# Patient Record
Sex: FEMALE | Race: WHITE | NOT HISPANIC OR LATINO | Employment: PART TIME | URBAN - METROPOLITAN AREA
[De-identification: names, ages, dates, MRNs, and addresses within clinical notes are randomized per-mention and may not be internally consistent; named-entity substitution may affect disease eponyms.]

---

## 2020-01-27 PROBLEM — G40.209 COMPLEX PARTIAL EPILEPSY WITH GENERALIZATION: Status: ACTIVE | Noted: 2020-01-27

## 2020-06-29 PROBLEM — Z12.11 SCREENING FOR COLORECTAL CANCER: Status: ACTIVE | Noted: 2020-06-29

## 2020-06-29 PROBLEM — Z12.12 SCREENING FOR COLORECTAL CANCER: Status: ACTIVE | Noted: 2020-06-29

## 2020-07-12 ENCOUNTER — NURSE TRIAGE (OUTPATIENT)
Dept: ADMINISTRATIVE | Facility: CLINIC | Age: 50
End: 2020-07-12

## 2020-07-12 NOTE — TELEPHONE ENCOUNTER
Attempted to call patient on behalf of [facility]. Pt did not answer on day 13. Unable to leave message. No contact protocol in place.

## 2020-07-12 NOTE — TELEPHONE ENCOUNTER
Reason for Disposition   Second attempt to contact family AND no contact made.  Phone number verified.    Additional Information   Negative: Caller is angry or rude (e.g., hangs up, verbally abusive, yelling)   Negative: Caller hangs up   Negative: Caller has already spoken with the PCP and has no further questions.   Negative: Caller has already spoken with another triager and has no further questions.   Negative: Caller has already spoken with another triager or PCP AND has further questions AND triager able to answer questions.   Negative: No answer.  First attempt to contact caller.  Follow-up call scheduled within 15 minutes.    Protocols used: NO CONTACT OR DUPLICATE CONTACT CALL-A-

## 2021-05-04 ENCOUNTER — PATIENT MESSAGE (OUTPATIENT)
Dept: RESEARCH | Facility: HOSPITAL | Age: 51
End: 2021-05-04

## 2021-08-14 ENCOUNTER — NURSE TRIAGE (OUTPATIENT)
Dept: ADMINISTRATIVE | Facility: CLINIC | Age: 51
End: 2021-08-14

## 2021-09-07 ENCOUNTER — NURSE TRIAGE (OUTPATIENT)
Dept: ADMINISTRATIVE | Facility: CLINIC | Age: 51
End: 2021-09-07

## 2021-11-10 ENCOUNTER — HOSPITAL ENCOUNTER (OUTPATIENT)
Dept: RADIOLOGY | Facility: HOSPITAL | Age: 51
Discharge: HOME | End: 2021-11-10
Attending: PHYSICIAN ASSISTANT
Payer: MEDICAID

## 2021-11-10 ENCOUNTER — OFFICE VISIT (OUTPATIENT)
Dept: NEUROLOGY | Facility: CLINIC | Age: 51
End: 2021-11-10
Payer: MEDICAID

## 2021-11-10 VITALS
WEIGHT: 122.38 LBS | SYSTOLIC BLOOD PRESSURE: 142 MMHG | BODY MASS INDEX: 20.89 KG/M2 | HEART RATE: 67 BPM | DIASTOLIC BLOOD PRESSURE: 86 MMHG | OXYGEN SATURATION: 98 % | HEIGHT: 64 IN

## 2021-11-10 DIAGNOSIS — M79.2 RADICULAR PAIN IN LEFT ARM: ICD-10-CM

## 2021-11-10 DIAGNOSIS — G40.209 PARTIAL SYMPTOMATIC EPILEPSY WITH COMPLEX PARTIAL SEIZURES, NOT INTRACTABLE, WITHOUT STATUS EPILEPTICUS: Primary | Chronic | ICD-10-CM

## 2021-11-10 DIAGNOSIS — M85.80 OSTEOPENIA, UNSPECIFIED LOCATION: ICD-10-CM

## 2021-11-10 DIAGNOSIS — F41.9 ANXIETY: ICD-10-CM

## 2021-11-10 DIAGNOSIS — M54.2 NECK PAIN OF OVER 3 MONTHS DURATION: ICD-10-CM

## 2021-11-10 DIAGNOSIS — R20.0 BILATERAL HAND NUMBNESS: ICD-10-CM

## 2021-11-10 DIAGNOSIS — F41.1 GAD (GENERALIZED ANXIETY DISORDER): ICD-10-CM

## 2021-11-10 DIAGNOSIS — E53.8 VITAMIN B 12 DEFICIENCY: ICD-10-CM

## 2021-11-10 PROCEDURE — 99204 OFFICE O/P NEW MOD 45 MIN: CPT | Mod: S$PBB,,, | Performed by: PHYSICIAN ASSISTANT

## 2021-11-10 PROCEDURE — 99999 PR PBB SHADOW E&M-EST. PATIENT-LVL III: CPT | Mod: PBBFAC,,, | Performed by: PHYSICIAN ASSISTANT

## 2021-11-10 PROCEDURE — 99999 PR PBB SHADOW E&M-EST. PATIENT-LVL III: ICD-10-PCS | Mod: PBBFAC,,, | Performed by: PHYSICIAN ASSISTANT

## 2021-11-10 PROCEDURE — 96372 THER/PROPH/DIAG INJ SC/IM: CPT | Mod: PBBFAC

## 2021-11-10 PROCEDURE — 72040 X-RAY EXAM NECK SPINE 2-3 VW: CPT | Mod: 26,,, | Performed by: RADIOLOGY

## 2021-11-10 PROCEDURE — 99204 PR OFFICE/OUTPT VISIT, NEW, LEVL IV, 45-59 MIN: ICD-10-PCS | Mod: S$PBB,,, | Performed by: PHYSICIAN ASSISTANT

## 2021-11-10 PROCEDURE — 72040 X-RAY EXAM NECK SPINE 2-3 VW: CPT | Mod: TC

## 2021-11-10 PROCEDURE — 72040 XR CERVICAL SPINE 2 OR 3 VIEWS: ICD-10-PCS | Mod: 26,,, | Performed by: RADIOLOGY

## 2021-11-10 PROCEDURE — 99213 OFFICE O/P EST LOW 20 MIN: CPT | Mod: PBBFAC | Performed by: PHYSICIAN ASSISTANT

## 2021-11-10 RX ORDER — CYANOCOBALAMIN 1000 UG/ML
1000 INJECTION, SOLUTION INTRAMUSCULAR; SUBCUTANEOUS
Status: COMPLETED | OUTPATIENT
Start: 2021-11-10 | End: 2021-11-10

## 2021-11-10 RX ORDER — LACOSAMIDE 100 MG/1
TABLET ORAL
Qty: 60 TABLET | Refills: 5 | Status: SHIPPED | OUTPATIENT
Start: 2021-11-10 | End: 2022-07-06

## 2021-11-10 RX ORDER — SERTRALINE HYDROCHLORIDE 100 MG/1
100 TABLET, FILM COATED ORAL DAILY
Qty: 30 TABLET | Refills: 5 | Status: SHIPPED | OUTPATIENT
Start: 2021-11-10 | End: 2022-07-06 | Stop reason: SDUPTHER

## 2021-11-10 RX ORDER — CYANOCOBALAMIN 1000 UG/ML
1000 INJECTION, SOLUTION INTRAMUSCULAR; SUBCUTANEOUS
Qty: 3 ML | Refills: 3 | Status: SHIPPED | OUTPATIENT
Start: 2021-11-10 | End: 2023-01-24

## 2021-11-10 RX ORDER — CLONAZEPAM 1 MG/1
TABLET ORAL
Qty: 60 TABLET | Refills: 5 | Status: SHIPPED | OUTPATIENT
Start: 2021-11-10 | End: 2022-05-24 | Stop reason: SDUPTHER

## 2021-11-10 RX ORDER — LAMOTRIGINE 200 MG/1
200 TABLET ORAL 2 TIMES DAILY
Qty: 60 TABLET | Refills: 5 | Status: SHIPPED | OUTPATIENT
Start: 2021-11-10 | End: 2022-07-06 | Stop reason: SDUPTHER

## 2021-11-10 RX ORDER — GABAPENTIN 100 MG/1
CAPSULE ORAL
Qty: 120 CAPSULE | Refills: 5 | Status: SHIPPED | OUTPATIENT
Start: 2021-11-10 | End: 2022-07-06

## 2021-11-10 RX ADMIN — CYANOCOBALAMIN 1000 MCG: 1000 INJECTION, SOLUTION INTRAMUSCULAR; SUBCUTANEOUS at 09:11

## 2022-01-01 ENCOUNTER — NURSE TRIAGE (OUTPATIENT)
Dept: ADMINISTRATIVE | Facility: CLINIC | Age: 52
End: 2022-01-01
Payer: MEDICAID

## 2022-01-01 NOTE — TELEPHONE ENCOUNTER
[facility] pt states lower back feels like she's been beaten up x3 days..     Pt was advised to see PCP within 3 days per triage protocol. Pt verbalized understanding.  Pt was advised if no appt to see HCP through [facility] pt verbalized understanding.    Reason for Disposition   [1] MODERATE back pain (e.g., interferes with normal activities) AND [2] present > 3 days    Additional Information   Negative: Passed out (i.e., lost consciousness, collapsed and was not responding)   Negative: Shock suspected (e.g., cold/pale/clammy skin, too weak to stand, low BP, rapid pulse)   Negative: Sounds like a life-threatening emergency to the triager   Negative: [1] SEVERE back pain (e.g., excruciating) AND [2] sudden onset AND [3] age > 60 years   Negative: [1] Unable to urinate (or only a few drops) > 4 hours AND [2] bladder feels very full (e.g., palpable bladder or strong urge to urinate)   Negative: [1] Loss of bladder or bowel control (urine or bowel incontinence; wetting self, leaking stool) AND [2] new-onset   Negative: Numbness in groin or rectal area (i.e., loss of sensation)   Negative: [1] SEVERE abdominal pain AND [2] present > 1 hour   Negative: [1] Abdominal pain AND [2] age > 60 years   Negative: Weakness of a leg or foot (e.g., unable to bear weight, dragging foot)   Negative: Unable to walk   Negative: Patient sounds very sick or weak to the triager   Negative: [1] SEVERE back pain (e.g., excruciating, unable to do any normal activities) AND [2] not improved 2 hours after pain medicine   Negative: [1] Pain radiates into the thigh or further down the leg AND [2] both legs   Negative: [1] Fever > 100.0 F (37.8 C) AND [2] flank pain (i.e., in side, below ribs and above hip)   Negative: [1] Pain or burning with passing urine (urination) AND [2] flank pain (i.e., in side, below ribs and above hip)   Negative: Numbness in a leg or foot (i.e., loss of sensation)   Negative: [1] Numbness in an arm or hand (i.e.,  "loss of sensation) AND [2] upper back pain   Negative: High-risk adult (e.g., history of cancer, HIV, or IV drug use)   Negative: Soft tissue infection (e.g., abscess, cellulitis) or other serious infection (e.g., bacteremia) in last 2 weeks   Negative: [1] Fever AND [2] no symptoms of UTI  (Exception: has generalized muscle pains, not localized back pain)   Negative: Rash in same area as pain (may be described as "small blisters")   Negative: Blood in urine (red, pink, or tea-colored)    Protocols used: BACK PAIN-A-AH      "

## 2022-02-10 ENCOUNTER — PATIENT MESSAGE (OUTPATIENT)
Dept: ADMINISTRATIVE | Facility: HOSPITAL | Age: 52
End: 2022-02-10
Payer: MEDICAID

## 2022-05-24 DIAGNOSIS — F41.9 ANXIETY: ICD-10-CM

## 2022-05-24 RX ORDER — CLONAZEPAM 1 MG/1
TABLET ORAL
Qty: 60 TABLET | Refills: 2 | Status: SHIPPED | OUTPATIENT
Start: 2022-05-24 | End: 2022-07-06 | Stop reason: SDUPTHER

## 2022-05-24 NOTE — TELEPHONE ENCOUNTER
----- Message from Livier Ferrara. BONITA Watkins sent at 2022 10:01 AM CDT -----  Contact: SELF  Serene Dalton  MRN: [id number]  : 1970  PCP: Andie Chavez  Home Phone      [phone]  Work Phone      Not on file.  Mobile          [phone]      MESSAGE: Needs refill on [facility]      PHONE:  [phone]  PHARMACY:  [facility] on [facility]

## 2022-05-24 NOTE — TELEPHONE ENCOUNTER
Patient requesting refill of clonazePAM 1 MG tablet    No show on 05/04/2022  Canceled on 05/10/2022  Scheduled for 07/06/2022

## 2022-07-06 ENCOUNTER — OFFICE VISIT (OUTPATIENT)
Dept: NEUROLOGY | Facility: CLINIC | Age: 52
End: 2022-07-06
Payer: MEDICAID

## 2022-07-06 VITALS
SYSTOLIC BLOOD PRESSURE: 122 MMHG | DIASTOLIC BLOOD PRESSURE: 90 MMHG | RESPIRATION RATE: 16 BRPM | HEART RATE: 81 BPM | BODY MASS INDEX: 20.36 KG/M2 | HEIGHT: 64 IN | WEIGHT: 119.25 LBS

## 2022-07-06 DIAGNOSIS — F41.9 ANXIETY: ICD-10-CM

## 2022-07-06 DIAGNOSIS — F41.1 GAD (GENERALIZED ANXIETY DISORDER): ICD-10-CM

## 2022-07-06 DIAGNOSIS — G40.209 PARTIAL SYMPTOMATIC EPILEPSY WITH COMPLEX PARTIAL SEIZURES, NOT INTRACTABLE, WITHOUT STATUS EPILEPTICUS: Primary | Chronic | ICD-10-CM

## 2022-07-06 PROBLEM — F41.8 ANXIETY WITH DEPRESSION: Status: ACTIVE | Noted: 2022-07-06

## 2022-07-06 PROCEDURE — 1160F PR REVIEW ALL MEDS BY PRESCRIBER/CLIN PHARMACIST DOCUMENTED: ICD-10-PCS | Mod: CPTII,,, | Performed by: PHYSICIAN ASSISTANT

## 2022-07-06 PROCEDURE — 3008F PR BODY MASS INDEX (BMI) DOCUMENTED: ICD-10-PCS | Mod: CPTII,,, | Performed by: PHYSICIAN ASSISTANT

## 2022-07-06 PROCEDURE — 3074F PR MOST RECENT SYSTOLIC BLOOD PRESSURE < 130 MM HG: ICD-10-PCS | Mod: CPTII,,, | Performed by: PHYSICIAN ASSISTANT

## 2022-07-06 PROCEDURE — 1160F RVW MEDS BY RX/DR IN RCRD: CPT | Mod: CPTII,,, | Performed by: PHYSICIAN ASSISTANT

## 2022-07-06 PROCEDURE — 3008F BODY MASS INDEX DOCD: CPT | Mod: CPTII,,, | Performed by: PHYSICIAN ASSISTANT

## 2022-07-06 PROCEDURE — 99204 OFFICE O/P NEW MOD 45 MIN: CPT | Mod: S$PBB,,, | Performed by: PHYSICIAN ASSISTANT

## 2022-07-06 PROCEDURE — 1159F MED LIST DOCD IN RCRD: CPT | Mod: CPTII,,, | Performed by: PHYSICIAN ASSISTANT

## 2022-07-06 PROCEDURE — 99213 OFFICE O/P EST LOW 20 MIN: CPT | Mod: PBBFAC | Performed by: PHYSICIAN ASSISTANT

## 2022-07-06 PROCEDURE — 99204 PR OFFICE/OUTPT VISIT, NEW, LEVL IV, 45-59 MIN: ICD-10-PCS | Mod: S$PBB,,, | Performed by: PHYSICIAN ASSISTANT

## 2022-07-06 PROCEDURE — 3074F SYST BP LT 130 MM HG: CPT | Mod: CPTII,,, | Performed by: PHYSICIAN ASSISTANT

## 2022-07-06 PROCEDURE — 3080F DIAST BP >= 90 MM HG: CPT | Mod: CPTII,,, | Performed by: PHYSICIAN ASSISTANT

## 2022-07-06 PROCEDURE — 1159F PR MEDICATION LIST DOCUMENTED IN MEDICAL RECORD: ICD-10-PCS | Mod: CPTII,,, | Performed by: PHYSICIAN ASSISTANT

## 2022-07-06 PROCEDURE — 3080F PR MOST RECENT DIASTOLIC BLOOD PRESSURE >= 90 MM HG: ICD-10-PCS | Mod: CPTII,,, | Performed by: PHYSICIAN ASSISTANT

## 2022-07-06 PROCEDURE — 99999 PR PBB SHADOW E&M-EST. PATIENT-LVL III: ICD-10-PCS | Mod: PBBFAC,,, | Performed by: PHYSICIAN ASSISTANT

## 2022-07-06 PROCEDURE — 99999 PR PBB SHADOW E&M-EST. PATIENT-LVL III: CPT | Mod: PBBFAC,,, | Performed by: PHYSICIAN ASSISTANT

## 2022-07-06 RX ORDER — CLONAZEPAM 1 MG/1
TABLET ORAL
Qty: 60 TABLET | Refills: 4 | Status: SHIPPED | OUTPATIENT
Start: 2022-07-06 | End: 2022-08-19 | Stop reason: SDUPTHER

## 2022-07-06 RX ORDER — LAMOTRIGINE 200 MG/1
200 TABLET ORAL 2 TIMES DAILY
Qty: 60 TABLET | Refills: 5 | Status: SHIPPED | OUTPATIENT
Start: 2022-07-06 | End: 2023-01-24 | Stop reason: SDUPTHER

## 2022-07-06 RX ORDER — SERTRALINE HYDROCHLORIDE 100 MG/1
100 TABLET, FILM COATED ORAL DAILY
Qty: 30 TABLET | Refills: 5 | Status: SHIPPED | OUTPATIENT
Start: 2022-07-06 | End: 2023-01-24 | Stop reason: SDUPTHER

## 2022-07-06 NOTE — PROGRESS NOTES
Subjective:       Patient ID: Serene Dalton is a 51 y.o. female.    Chief Complaint: Neurologic Problem (6 month follow up)      HPI:  Serene Dalton is a 51 y.o. female is here for initial visit to establish care. She has been my patient in the past for many years. She carries the diagnosis of epilepsy.  Medications and tolerability were reviewed and discussed in detail. Voices no severe side effects of medications prescribed. She has stopped Vimpat on her own after finding out that people abuse it. She was afraid. She has had no further auras/seizures since stopping it. She is once again on Lamictal monotherapy. She takes clonazepam as well, low dose.    She has anxiety and depression. Clonazepam and Zoloft help. She recently lost her home, and this has put more stress on her. She is overall doing well, though.    She is once again receiving monthly B 12 injections. She feels better. She did have a recent bout of excessive fatigue, but this has resolved.      Past Medical History:   Diagnosis Date    Anxiety     Bell's palsy     Seizures        Past Surgical History:   Procedure Laterality Date     SECTION      COLONOSCOPY N/A 2020    Procedure: COLONOSCOPY;  Surgeon: Tutu Garay MD;  Location: [facility];  Service: General;  Laterality: N/A;    HYSTERECTOMY      OOPHORECTOMY         Family History   Problem Relation Age of Onset    Heart disease Mother     Heart disease Father        Social History     Socioeconomic History    Marital status: Legally    Tobacco Use    Smoking status: Current Every Day Smoker     Packs/day: 0.50     Years: 20.00     Pack years: 10.00     Types: Cigarettes     Last attempt to quit: 3/11/2019     Years since quitting: 3.3    Smokeless tobacco: Never Used   Substance and Sexual Activity    Alcohol use: Yes     Comment: occasionally    Drug use: No    Sexual activity: Not Currently     Birth control/protection: Surgical       Current  Outpatient Medications   Medication Sig Dispense Refill    albuterol (PROVENTIL/VENTOLIN HFA) 90 mcg/actuation inhaler Inhale 2 puffs into the lungs every 4 (four) hours as needed (cough, wheeze, Shortness of breath). For 4 days, then every 6 hours while awake for 4 days. For cough and wheeze. Use with spacer. 8 g 0    aspirin (ECOTRIN) 81 MG EC tablet Take 81 mg by mouth once daily.      cyanocobalamin 1,000 mcg/mL injection Inject 1 mL (1,000 mcg total) into the skin every 30 days. 3 mL 3    inhalation spacing device Use as directed for inhalation. 1 each 0           clonazePAM (KLONOPIN) 1 MG tablet TAKE 1/2 TO 1 (ONE-HALF TO ONE) TABLET BY MOUTH ONCE TO TWICE DAILY 60 tablet 4    lamoTRIgine (LAMICTAL) 200 MG tablet Take 1 tablet (200 mg total) by mouth 2 (two) times daily. 60 tablet 5    sertraline (ZOLOFT) 100 MG tablet Take 1 tablet (100 mg total) by mouth once daily. 30 tablet 5     No current facility-administered medications for this visit.       Review of patient's allergies indicates:  No Known Allergies        Review of Systems   Constitutional: Negative for appetite change and fever.   HENT: Negative for sore throat.    Eyes: Negative for visual disturbance.   Respiratory: Negative for cough and shortness of breath.    Cardiovascular: Negative for chest pain.   Gastrointestinal: Negative for nausea and vomiting.   Endocrine: Negative for cold intolerance and heat intolerance.   Genitourinary: Negative for difficulty urinating.   Musculoskeletal: Negative for arthralgias, back pain, gait problem and neck pain.   Skin: Negative for rash.   Allergic/Immunologic: Negative for food allergies.   Neurological: Positive for seizures (controlled since last visit.). Negative for dizziness, tremors, speech difficulty, weakness, numbness and headaches.   Hematological: Does not bruise/bleed easily.   Psychiatric/Behavioral: Negative for agitation, decreased concentration and sleep disturbance. The patient  is nervous/anxious.        Objective:      Neurologic Exam     Mental Status   Oriented to person, place, and time.     Cranial Nerves     CN III, IV, VI   Pupils are equal, round, and reactive to light.    Gait, Coordination, and Reflexes     Gait  Gait: normal    Reflexes   Right brachioradialis: 2+  Left brachioradialis: 2+  Right biceps: 1+  Left biceps: 1+  Right triceps: 1+  Left triceps: 1+  Right patellar: 2+  Left patellar: 2+  Right achilles: 2+  Left achilles: 2+    Physical Exam  Vitals and nursing note reviewed.   Constitutional:       Appearance: Normal appearance. She is normal weight.   HENT:      Head: Atraumatic.      Nose: Nose normal.      Mouth/Throat:      Mouth: Mucous membranes are moist.      Pharynx: Oropharynx is clear.   Eyes:      General: No visual field deficit.     Extraocular Movements: Extraocular movements intact.      Pupils: Pupils are equal, round, and reactive to light.   Cardiovascular:      Rate and Rhythm: Normal rate and regular rhythm.      Pulses: Normal pulses.   Pulmonary:      Effort: Pulmonary effort is normal.      Breath sounds: Normal breath sounds.   Abdominal:      General: Abdomen is flat.      Palpations: Abdomen is soft.   Musculoskeletal:         General: No swelling or deformity. Normal range of motion.      Cervical back: Normal range of motion. Tenderness (paracervical tenderness left>>right) present.   Skin:     General: Skin is warm and dry.   Neurological:      Mental Status: She is alert and oriented to person, place, and time.      Cranial Nerves: No cranial nerve deficit or facial asymmetry.      Sensory: Sensation is intact.      Motor: Motor function is intact. No weakness or tremor.      Coordination: Coordination is intact.      Gait: Gait is intact.      Deep Tendon Reflexes:      Reflex Scores:       Tricep reflexes are 1+ on the right side and 1+ on the left side.       Bicep reflexes are 1+ on the right side and 1+ on the left side.        Brachioradialis reflexes are 2+ on the right side and 2+ on the left side.       Patellar reflexes are 2+ on the right side and 2+ on the left side.       Achilles reflexes are 2+ on the right side and 2+ on the left side.     Comments: Negative Spurling's maneuver R and L  Rodgers's negative           Assessment:       1. Partial symptomatic epilepsy with complex partial seizures, not intractable, without status epilepticus    2. Anxiety    3. VERONICA (generalized anxiety disorder)        Plan:   Discussed risks and benefits of potential treatment options at length as well as potential side effects of medication changes. After careful consideration,  current medications were changed. Please refer to medication reconciliation for details. Seizure precautions were discussed. Specifically discussed driving restrictions per [place] law. Medication compliance discussed. Instructed to call clinic if concerned or to ED if emergency.  We discussed occupational risks and hazards, driving restrictions and limitations, and general safety in patients with epilepsy and patients with episodes of loss of consciousness from any etiology. I specifically pointed out how patients can put themselves and others at serious risks (leading to death and/or injury) if they have a seizure (or episode of loss of consciousness)  while driving. Patient verbalized understanding    Partial symptomatic epilepsy with complex partial seizures, not intractable, without status epilepticus  -     lamoTRIgine (LAMICTAL) 200 MG tablet; Take 1 tablet (200 mg total) by mouth 2 (two) times daily.  Dispense: 60 tablet; Refill: 5    VERONICA (generalized anxiety disorder)  -     sertraline (ZOLOFT) 100 MG tablet; Take 1 tablet (100 mg total) by mouth once daily.  Dispense: 30 tablet; Refill: 5    Anxiety  -     clonazePAM (KLONOPIN) 1 MG tablet; TAKE 1/2 TO 1 (ONE-HALF TO ONE) TABLET BY MOUTH ONCE TO TWICE DAILY  Dispense: 60 tablet; Refill: 5    She had some  radicular symptoms in arm 6 months ago. She took gabapentin with relief. She did not follow through with [facility] as ordered.    Sheridan Sepulveda, PA

## 2022-08-19 DIAGNOSIS — F41.9 ANXIETY: ICD-10-CM

## 2022-08-19 NOTE — TELEPHONE ENCOUNTER
----- Message from Shivani Gutierrez sent at 2022 11:22 AM CDT -----  Contact: PATIENT  Serene Dalton  MRN: [id number]  : 1970  PCP: Andie Chavez  Home Phone      [phone]  Work Phone      Not on file.  Mobile          [phone]      MESSAGE: Patient needs a refill on Klonopin 1 mg 1/2-1 BID PRN sent to [facility].        Phone: [phone]

## 2022-08-22 RX ORDER — CLONAZEPAM 1 MG/1
TABLET ORAL
Qty: 60 TABLET | Refills: 4 | Status: SHIPPED | OUTPATIENT
Start: 2022-08-22 | End: 2022-12-20

## 2022-10-03 ENCOUNTER — PATIENT MESSAGE (OUTPATIENT)
Dept: ADMINISTRATIVE | Facility: HOSPITAL | Age: 52
End: 2022-10-03
Payer: MEDICAID

## 2022-11-30 DIAGNOSIS — Z12.31 OTHER SCREENING MAMMOGRAM: ICD-10-CM

## 2023-01-09 ENCOUNTER — TELEPHONE (OUTPATIENT)
Dept: NEUROLOGY | Facility: CLINIC | Age: 53
End: 2023-01-09
Payer: MEDICAID

## 2023-01-09 NOTE — TELEPHONE ENCOUNTER
Attempted to contact patient; Unable to leave voicemail.  Patient rescheduled and letter sent to patient address.

## 2023-01-24 ENCOUNTER — OFFICE VISIT (OUTPATIENT)
Dept: NEUROLOGY | Facility: CLINIC | Age: 53
End: 2023-01-24
Payer: MEDICAID

## 2023-01-24 VITALS
WEIGHT: 126.56 LBS | RESPIRATION RATE: 14 BRPM | HEIGHT: 64 IN | SYSTOLIC BLOOD PRESSURE: 118 MMHG | DIASTOLIC BLOOD PRESSURE: 80 MMHG | BODY MASS INDEX: 21.61 KG/M2 | HEART RATE: 67 BPM

## 2023-01-24 DIAGNOSIS — F41.9 ANXIETY: ICD-10-CM

## 2023-01-24 DIAGNOSIS — G40.209 PARTIAL SYMPTOMATIC EPILEPSY WITH COMPLEX PARTIAL SEIZURES, NOT INTRACTABLE, WITHOUT STATUS EPILEPTICUS: Primary | Chronic | ICD-10-CM

## 2023-01-24 DIAGNOSIS — F41.8 DEPRESSION WITH ANXIETY: ICD-10-CM

## 2023-01-24 PROCEDURE — 3074F SYST BP LT 130 MM HG: CPT | Mod: CPTII,,, | Performed by: PHYSICIAN ASSISTANT

## 2023-01-24 PROCEDURE — 3079F PR MOST RECENT DIASTOLIC BLOOD PRESSURE 80-89 MM HG: ICD-10-PCS | Mod: CPTII,,, | Performed by: PHYSICIAN ASSISTANT

## 2023-01-24 PROCEDURE — 99213 OFFICE O/P EST LOW 20 MIN: CPT | Mod: PBBFAC | Performed by: PHYSICIAN ASSISTANT

## 2023-01-24 PROCEDURE — 99999 PR PBB SHADOW E&M-EST. PATIENT-LVL III: ICD-10-PCS | Mod: PBBFAC,,, | Performed by: PHYSICIAN ASSISTANT

## 2023-01-24 PROCEDURE — 3008F BODY MASS INDEX DOCD: CPT | Mod: CPTII,,, | Performed by: PHYSICIAN ASSISTANT

## 2023-01-24 PROCEDURE — 1160F PR REVIEW ALL MEDS BY PRESCRIBER/CLIN PHARMACIST DOCUMENTED: ICD-10-PCS | Mod: CPTII,,, | Performed by: PHYSICIAN ASSISTANT

## 2023-01-24 PROCEDURE — 99999 PR PBB SHADOW E&M-EST. PATIENT-LVL III: CPT | Mod: PBBFAC,,, | Performed by: PHYSICIAN ASSISTANT

## 2023-01-24 PROCEDURE — 1159F PR MEDICATION LIST DOCUMENTED IN MEDICAL RECORD: ICD-10-PCS | Mod: CPTII,,, | Performed by: PHYSICIAN ASSISTANT

## 2023-01-24 PROCEDURE — 3074F PR MOST RECENT SYSTOLIC BLOOD PRESSURE < 130 MM HG: ICD-10-PCS | Mod: CPTII,,, | Performed by: PHYSICIAN ASSISTANT

## 2023-01-24 PROCEDURE — 99214 PR OFFICE/OUTPT VISIT, EST, LEVL IV, 30-39 MIN: ICD-10-PCS | Mod: S$PBB,,, | Performed by: PHYSICIAN ASSISTANT

## 2023-01-24 PROCEDURE — 3079F DIAST BP 80-89 MM HG: CPT | Mod: CPTII,,, | Performed by: PHYSICIAN ASSISTANT

## 2023-01-24 PROCEDURE — 1159F MED LIST DOCD IN RCRD: CPT | Mod: CPTII,,, | Performed by: PHYSICIAN ASSISTANT

## 2023-01-24 PROCEDURE — 3008F PR BODY MASS INDEX (BMI) DOCUMENTED: ICD-10-PCS | Mod: CPTII,,, | Performed by: PHYSICIAN ASSISTANT

## 2023-01-24 PROCEDURE — 1160F RVW MEDS BY RX/DR IN RCRD: CPT | Mod: CPTII,,, | Performed by: PHYSICIAN ASSISTANT

## 2023-01-24 PROCEDURE — 99214 OFFICE O/P EST MOD 30 MIN: CPT | Mod: S$PBB,,, | Performed by: PHYSICIAN ASSISTANT

## 2023-01-24 RX ORDER — SERTRALINE HYDROCHLORIDE 100 MG/1
TABLET, FILM COATED ORAL
Qty: 60 TABLET | Refills: 5 | Status: SHIPPED | OUTPATIENT
Start: 2023-01-24 | End: 2023-09-20 | Stop reason: SDUPTHER

## 2023-01-24 RX ORDER — LAMOTRIGINE 200 MG/1
200 TABLET ORAL 2 TIMES DAILY
Qty: 60 TABLET | Refills: 5 | Status: SHIPPED | OUTPATIENT
Start: 2023-01-24 | End: 2023-09-20 | Stop reason: SDUPTHER

## 2023-01-24 RX ORDER — CLONAZEPAM 1 MG/1
TABLET ORAL
Qty: 60 TABLET | Refills: 5 | Status: SHIPPED | OUTPATIENT
Start: 2023-01-24 | End: 2023-03-28 | Stop reason: SDUPTHER

## 2023-01-24 NOTE — LETTER
January 24, 2023    Serene Dalton  [address]  [place] [address]         [facility]  [address] [facility] [address] [address]  Phone: [phone]  Fax: [phone] January 24, 2023     Patient: Serene Dalton   YOB: 1970   Date of Visit: 1/24/2023       To Whom It May Concern:    It is my medical opinion that Serene Dalton may return to full duty immediately with no restrictions. She was seen in the office today.    She needs to go back to work for her mental health! Please get her out of the house.    If you have any questions or concerns, please don't hesitate to call.    Sincerely,        ANA Hawley

## 2023-01-24 NOTE — PROGRESS NOTES
Subjective:       Patient ID: Serene Dalton is a 52 y.o. female.    Chief Complaint: Neurologic Problem (Follow up/ epilepsy)      HPI:  Serene Dalton is a 52 y.o. female is here for follow up visit regarding epilepsy. Medications and tolerability were reviewed. Patient states compliance with seizure medications.  No seizures since last visit. She is stable on Lamotrigine 200 mg twice a day. She tolerates the medication very well.    She has been very down. Not getting out of the house, not eating. She has lost her father, uncle, aunt and best friend in the past 4 months. This has caused her to be depressed. She is taking Zoloft 100 mg in addition to Lamictal and clonazepam. She is still suffering with depression. She is normally not depressed, but she has anxiety.    She has no SI/HI. She is only down and unmotivated.     Past Medical History:   Diagnosis Date    Anxiety     Bell's palsy     Seizures        Past Surgical History:   Procedure Laterality Date     SECTION      COLONOSCOPY N/A 2020    Procedure: COLONOSCOPY;  Surgeon: Tutu Garay MD;  Location: [facility];  Service: General;  Laterality: N/A;    HYSTERECTOMY      OOPHORECTOMY         Family History   Problem Relation Age of Onset    Heart disease Mother     Heart disease Father        Social History     Socioeconomic History    Marital status: Legally    Tobacco Use    Smoking status: Every Day     Packs/day: 0.50     Years: 20.00     Pack years: 10.00     Types: Cigarettes     Last attempt to quit: 3/11/2019     Years since quitting: 3.8    Smokeless tobacco: Never   Substance and Sexual Activity    Alcohol use: Yes     Comment: occasionally    Drug use: No    Sexual activity: Not Currently     Birth control/protection: Surgical       Current Outpatient Medications   Medication Sig Dispense Refill    albuterol (PROVENTIL/VENTOLIN HFA) 90 mcg/actuation inhaler Inhale 2 puffs into the lungs every 4 (four) hours as  needed (cough, wheeze, Shortness of breath). For 4 days, then every 6 hours while awake for 4 days. For cough and wheeze. Use with spacer. 8 g 0    aspirin (ECOTRIN) 81 MG EC tablet Take 81 mg by mouth once daily.      inhalation spacing device Use as directed for inhalation. 1 each 0    ondansetron (ZOFRAN-ODT) 4 MG TbDL Take 2 tablets (8 mg total) by mouth every 6 (six) hours as needed (nausea). 30 tablet 0    clonazePAM (KLONOPIN) 1 MG tablet TAKE 1/2-1 TABLET BY MOUTH 1-2 TIMES DAILY 60 tablet 5    lamoTRIgine (LAMICTAL) 200 MG tablet Take 1 tablet (200 mg total) by mouth 2 (two) times daily. 60 tablet 5    sertraline (ZOLOFT) 100 MG tablet 2 tablets in AM for anxiety and depression. 60 tablet 5     No current facility-administered medications for this visit.       Review of patient's allergies indicates:  No Known Allergies        Review of Systems   Constitutional:  Positive for appetite change (decreased). Negative for fever.   HENT:  Negative for sore throat.    Eyes:  Negative for visual disturbance.   Respiratory:  Negative for cough and shortness of breath.    Cardiovascular:  Negative for chest pain.   Gastrointestinal:  Negative for nausea and vomiting.   Endocrine: Negative for cold intolerance and heat intolerance.   Genitourinary:  Negative for difficulty urinating.   Musculoskeletal:  Negative for arthralgias, back pain, gait problem and neck pain.   Skin:  Negative for rash.   Allergic/Immunologic: Negative for food allergies.   Neurological:  Positive for seizures (controlled since last visit.). Negative for dizziness, tremors, speech difficulty, weakness, numbness and headaches.   Hematological:  Does not bruise/bleed easily.   Psychiatric/Behavioral:  Positive for dysphoric mood (down over the past few months). Negative for agitation, decreased concentration and sleep disturbance. The patient is nervous/anxious.      Objective:      Neurologic Exam     Mental Status   Oriented to person, place,  and time.     Cranial Nerves     CN III, IV, VI   Pupils are equal, round, and reactive to light.    Gait, Coordination, and Reflexes     Gait  Gait: normal    Reflexes   Right brachioradialis: 2+  Left brachioradialis: 2+  Right biceps: 1+  Left biceps: 1+  Right triceps: 1+  Left triceps: 1+  Right patellar: 2+  Left patellar: 2+  Right achilles: 2+  Left achilles: 2+  Physical Exam  Vitals and nursing note reviewed.   Constitutional:       Appearance: Normal appearance. She is normal weight.   HENT:      Head: Atraumatic.      Nose: Nose normal.      Mouth/Throat:      Mouth: Mucous membranes are moist.      Pharynx: Oropharynx is clear.   Eyes:      General: No visual field deficit.     Extraocular Movements: Extraocular movements intact.      Pupils: Pupils are equal, round, and reactive to light.   Cardiovascular:      Rate and Rhythm: Normal rate and regular rhythm.      Pulses: Normal pulses.   Pulmonary:      Effort: Pulmonary effort is normal.      Breath sounds: Normal breath sounds.   Abdominal:      General: Abdomen is flat.      Palpations: Abdomen is soft.   Musculoskeletal:         General: No swelling or deformity. Normal range of motion.      Cervical back: Normal range of motion. No tenderness.   Skin:     General: Skin is warm and dry.   Neurological:      Mental Status: She is alert and oriented to person, place, and time.      Cranial Nerves: No cranial nerve deficit or facial asymmetry.      Sensory: Sensation is intact.      Motor: Motor function is intact. No weakness or tremor.      Coordination: Coordination is intact.      Gait: Gait is intact.      Deep Tendon Reflexes:      Reflex Scores:       Tricep reflexes are 1+ on the right side and 1+ on the left side.       Bicep reflexes are 1+ on the right side and 1+ on the left side.       Brachioradialis reflexes are 2+ on the right side and 2+ on the left side.       Patellar reflexes are 2+ on the right side and 2+ on the left side.        Achilles reflexes are 2+ on the right side and 2+ on the left side.     Comments: Negative Spurling's maneuver R and L  Rodgers's negative     Psychiatric:         Mood and Affect: Mood normal.         Behavior: Behavior normal.         Thought Content: Thought content normal.         Judgment: Judgment normal.      Comments: She is pleasant       Assessment:       1. Partial symptomatic epilepsy with complex partial seizures, not intractable, without status epilepticus    2. Depression with anxiety    3. Anxiety          Plan:   Discussed risks and benefits of potential treatment options as well as potential side effects of medications.  Patient was counseled to continue current medications. Seizure precautions were discussed. Specifically discussed driving restrictions per [place] law. Medication compliance discussed. Instructed to call clinic if concerned or to ED if emergency.    Partial symptomatic epilepsy with complex partial seizures, not intractable, without status epilepticus  -     lamoTRIgine (LAMICTAL) 200 MG tablet; Take 1 tablet (200 mg total) by mouth 2 (two) times daily.  Dispense: 60 tablet; Refill: 5  Stable without breakthrough seizures.    Depression with anxiety  -     sertraline (ZOLOFT) 100 MG tablet; 2 tablets in AM for anxiety and depression.  Dispense: 60 tablet; Refill: 5  A dose increase. If not tolerated, may increase to 1.5 tablets daily and gradually increase if necessary.  If increased dose not helpful, she will let me know.  Situational depression due to several deaths of persons close to her including her father.  Encouraged her to get out of the house, get back to work.     Anxiety  -     clonazePAM (KLONOPIN) 1 MG tablet; TAKE 1/2-1 TABLET BY MOUTH 1-2 TIMES DAILY  Dispense: 60 tablet; Refill: 5        ANA Barrientos

## 2023-03-28 DIAGNOSIS — G40.909 SEIZURE DISORDER: Primary | ICD-10-CM

## 2023-03-28 DIAGNOSIS — F41.9 ANXIETY: ICD-10-CM

## 2023-03-28 RX ORDER — CLONAZEPAM 1 MG/1
TABLET ORAL
Qty: 60 TABLET | Refills: 5 | Status: SHIPPED | OUTPATIENT
Start: 2023-03-28 | End: 2023-09-20 | Stop reason: SDUPTHER

## 2023-03-28 RX ORDER — DIAZEPAM 5 MG/1
TABLET ORAL
Qty: 60 TABLET | Refills: 1 | Status: SHIPPED | OUTPATIENT
Start: 2023-03-28 | End: 2023-03-28 | Stop reason: SDUPTHER

## 2023-03-28 RX ORDER — DIAZEPAM 5 MG/1
TABLET ORAL
Qty: 1 TABLET | Refills: 0 | Status: SHIPPED | OUTPATIENT
Start: 2023-03-28 | End: 2024-03-20

## 2023-09-20 DIAGNOSIS — F41.8 DEPRESSION WITH ANXIETY: ICD-10-CM

## 2023-09-20 DIAGNOSIS — F41.9 ANXIETY: ICD-10-CM

## 2023-09-20 DIAGNOSIS — G40.209 PARTIAL SYMPTOMATIC EPILEPSY WITH COMPLEX PARTIAL SEIZURES, NOT INTRACTABLE, WITHOUT STATUS EPILEPTICUS: Chronic | ICD-10-CM

## 2023-09-20 RX ORDER — SERTRALINE HYDROCHLORIDE 100 MG/1
TABLET, FILM COATED ORAL
Qty: 60 TABLET | Refills: 5 | Status: SHIPPED | OUTPATIENT
Start: 2023-09-20 | End: 2024-03-05

## 2023-09-20 RX ORDER — LAMOTRIGINE 200 MG/1
200 TABLET ORAL 2 TIMES DAILY
Qty: 60 TABLET | Refills: 5 | Status: SHIPPED | OUTPATIENT
Start: 2023-09-20 | End: 2024-03-20 | Stop reason: SDUPTHER

## 2023-09-20 RX ORDER — CLONAZEPAM 1 MG/1
TABLET ORAL
Qty: 60 TABLET | Refills: 5 | Status: SHIPPED | OUTPATIENT
Start: 2023-09-20 | End: 2024-03-20 | Stop reason: SDUPTHER

## 2023-09-20 NOTE — TELEPHONE ENCOUNTER
----- Message from Shivani Gutierrez sent at 2023 11:16 AM CDT -----  Contact: PATIENT  Serene Dalton  MRN: [id number]  : 1970  PCP: Andie Chavez  Home Phone      [phone]  Work Phone      Not on file.  Mobile          [phone]      MESSAGE: Patient needs a refill on Sertraline 100 mg 1 daily, Lamotrigine 200 mg BID & Clonazepam 1 mg 1-2 x daily sent to [facility].        Phone: [phone]

## 2024-03-20 ENCOUNTER — OFFICE VISIT (OUTPATIENT)
Dept: NEUROLOGY | Facility: CLINIC | Age: 54
End: 2024-03-20
Payer: MEDICAID

## 2024-03-20 VITALS
HEART RATE: 74 BPM | DIASTOLIC BLOOD PRESSURE: 86 MMHG | HEIGHT: 64 IN | SYSTOLIC BLOOD PRESSURE: 126 MMHG | WEIGHT: 117.31 LBS | BODY MASS INDEX: 20.03 KG/M2 | OXYGEN SATURATION: 95 %

## 2024-03-20 DIAGNOSIS — F41.8 DEPRESSION WITH ANXIETY: ICD-10-CM

## 2024-03-20 DIAGNOSIS — F41.9 ANXIETY: ICD-10-CM

## 2024-03-20 DIAGNOSIS — G40.209 PARTIAL SYMPTOMATIC EPILEPSY WITH COMPLEX PARTIAL SEIZURES, NOT INTRACTABLE, WITHOUT STATUS EPILEPTICUS: Chronic | ICD-10-CM

## 2024-03-20 PROCEDURE — 1159F MED LIST DOCD IN RCRD: CPT | Mod: CPTII,,, | Performed by: PHYSICIAN ASSISTANT

## 2024-03-20 PROCEDURE — 99999 PR PBB SHADOW E&M-EST. PATIENT-LVL III: CPT | Mod: PBBFAC,,, | Performed by: PHYSICIAN ASSISTANT

## 2024-03-20 PROCEDURE — 99213 OFFICE O/P EST LOW 20 MIN: CPT | Mod: S$PBB,,, | Performed by: PHYSICIAN ASSISTANT

## 2024-03-20 PROCEDURE — 3008F BODY MASS INDEX DOCD: CPT | Mod: CPTII,,, | Performed by: PHYSICIAN ASSISTANT

## 2024-03-20 PROCEDURE — 1160F RVW MEDS BY RX/DR IN RCRD: CPT | Mod: CPTII,,, | Performed by: PHYSICIAN ASSISTANT

## 2024-03-20 PROCEDURE — 99213 OFFICE O/P EST LOW 20 MIN: CPT | Mod: PBBFAC | Performed by: PHYSICIAN ASSISTANT

## 2024-03-20 PROCEDURE — 3074F SYST BP LT 130 MM HG: CPT | Mod: CPTII,,, | Performed by: PHYSICIAN ASSISTANT

## 2024-03-20 PROCEDURE — 3079F DIAST BP 80-89 MM HG: CPT | Mod: CPTII,,, | Performed by: PHYSICIAN ASSISTANT

## 2024-03-20 RX ORDER — SERTRALINE HYDROCHLORIDE 100 MG/1
TABLET, FILM COATED ORAL
Qty: 60 TABLET | Refills: 5 | Status: SHIPPED | OUTPATIENT
Start: 2024-03-20

## 2024-03-20 RX ORDER — CLONAZEPAM 1 MG/1
TABLET ORAL
Qty: 60 TABLET | Refills: 5 | Status: SHIPPED | OUTPATIENT
Start: 2024-03-20

## 2024-03-20 RX ORDER — LAMOTRIGINE 200 MG/1
200 TABLET ORAL 2 TIMES DAILY
Qty: 60 TABLET | Refills: 5 | Status: SHIPPED | OUTPATIENT
Start: 2024-03-20

## 2024-03-20 NOTE — PROGRESS NOTES
Subjective:       Patient ID: Serene Dalton is a 53 y.o. female.    Chief Complaint: Neurologic Problem (Follow up.)      HPI:  Serene Dalton is a 53 y.o. female is here for follow up visit regarding epilepsy. Medications and tolerability were reviewed. Patient states compliance with seizure medications.  No seizures since last visit. She is stable on Lamotrigine 200 mg twice a day. She tolerates the medication very well. She has nausea frequently which could be side effect, but overall she tolerates it.    She continues with anxiety and depression, worse since her father  in . She takes Zoloft 200 mg daily with Lamictal and klonopin 1 mg BID which helps anxiety. 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 She really enjoys this. Otherwise, she rarely leaves the house. She has her disabled schizophrenic 20 year old Godchild living with her which is challenging. He has no one else.    She continue to smoke. She does not hydrate well, and she does not have an appetite. Previously followed by Dr. Chavez at [facility]. She has not had follow up in a few years.    Past Medical History:   Diagnosis Date    Anxiety     Bell's palsy     Seizures        Past Surgical History:   Procedure Laterality Date     SECTION      COLONOSCOPY N/A 2020    Procedure: COLONOSCOPY;  Surgeon: Tutu Garay MD;  [facility];  Service: General;  Laterality: N/A;    HYSTERECTOMY      OOPHORECTOMY         Family History   Problem Relation Age of Onset    Heart disease Mother     Heart disease Father        Social History     Socioeconomic History    Marital status: Legally    Tobacco Use    Smoking status: Every Day     Current packs/day: 0.00     Average packs/day: 0.5 packs/day for 20.0 years (10.0 ttl pk-yrs)     Types: Cigarettes     Start date: 3/11/1999     Last attempt to quit: 3/11/2019     Years since quittin.0    Smokeless tobacco: Never   Substance and  Sexual Activity    Alcohol use: Yes     Comment: occasionally    Drug use: No    Sexual activity: Not Currently     Birth control/protection: Surgical       Current Outpatient Medications   Medication Sig Dispense Refill    albuterol (PROVENTIL/VENTOLIN HFA) 90 mcg/actuation inhaler Inhale 2 puffs into the lungs every 4 (four) hours as needed (cough, wheeze, Shortness of breath). For 4 days, then every 6 hours while awake for 4 days. For cough and wheeze. Use with spacer. 8 g 0    aspirin (ECOTRIN) 81 MG EC tablet Take 81 mg by mouth once daily.      clonazePAM (KLONOPIN) 1 MG tablet TAKE 1/2-1 TABLET BY MOUTH 1-2 TIMES DAILY 60 tablet 5    lamoTRIgine (LAMICTAL) 200 MG tablet Take 1 tablet (200 mg total) by mouth 2 (two) times daily. 60 tablet 5    sertraline (ZOLOFT) 100 MG tablet Take one tablet 1-2 times a day as needed. 60 tablet 5     No current facility-administered medications for this visit.       Review of patient's allergies indicates:  No Known Allergies          Past Medical History:   Diagnosis Date    Anxiety     Bell's palsy     Seizures        Past Surgical History:   Procedure Laterality Date     SECTION      COLONOSCOPY N/A 2020    Procedure: COLONOSCOPY;  Surgeon: Tutu Garay MD;  Location: [facility];  Service: General;  Laterality: N/A;    HYSTERECTOMY      OOPHORECTOMY         Family History   Problem Relation Age of Onset    Heart disease Mother     Heart disease Father        Social History     Socioeconomic History    Marital status: Legally    Tobacco Use    Smoking status: Every Day     Current packs/day: 0.00     Average packs/day: 0.5 packs/day for 20.0 years (10.0 ttl pk-yrs)     Types: Cigarettes     Start date: 3/11/1999     Last attempt to quit: 3/11/2019     Years since quittin.0    Smokeless tobacco: Never   Substance and Sexual Activity    Alcohol use: Yes     Comment: occasionally    Drug use: No    Sexual activity: Not Currently     Birth  control/protection: Surgical       Current Outpatient Medications   Medication Sig Dispense Refill    albuterol (PROVENTIL/VENTOLIN HFA) 90 mcg/actuation inhaler Inhale 2 puffs into the lungs every 4 (four) hours as needed (cough, wheeze, Shortness of breath). For 4 days, then every 6 hours while awake for 4 days. For cough and wheeze. Use with spacer. 8 g 0    aspirin (ECOTRIN) 81 MG EC tablet Take 81 mg by mouth once daily.      clonazePAM (KLONOPIN) 1 MG tablet TAKE 1/2-1 TABLET BY MOUTH 1-2 TIMES DAILY 60 tablet 5    lamoTRIgine (LAMICTAL) 200 MG tablet Take 1 tablet (200 mg total) by mouth 2 (two) times daily. 60 tablet 5    sertraline (ZOLOFT) 100 MG tablet Take one tablet 1-2 times a day as needed. 60 tablet 5     No current facility-administered medications for this visit.       Review of patient's allergies indicates:  No Known Allergies        Review of Systems   Constitutional:  Positive for appetite change (decreased). Negative for fever.   HENT:  Negative for sore throat.    Eyes:  Negative for visual disturbance.   Respiratory:  Negative for cough and shortness of breath.    Cardiovascular:  Negative for chest pain.   Gastrointestinal:  Negative for nausea and vomiting.   Endocrine: Negative for cold intolerance and heat intolerance.   Genitourinary:  Negative for difficulty urinating.   Musculoskeletal:  Negative for arthralgias, back pain, gait problem and neck pain.   Skin:  Negative for rash.   Allergic/Immunologic: Negative for food allergies.   Neurological:  Positive for light-headedness (light headedness upon standing at times, no syncope). Negative for dizziness, tremors, seizures (controlled), speech difficulty, weakness, numbness and headaches.   Hematological:  Does not bruise/bleed easily.   Psychiatric/Behavioral:  Positive for dysphoric mood. Negative for agitation, decreased concentration and sleep disturbance. The patient is nervous/anxious.        Objective:      Neurologic Exam      Mental Status   Oriented to person, place, and time.     Cranial Nerves     CN III, IV, VI   Pupils are equal, round, and reactive to light.    Gait, Coordination, and Reflexes     Gait  Gait: normal    Reflexes   Right brachioradialis: 2+  Left brachioradialis: 2+  Right biceps: 1+  Left biceps: 1+  Right triceps: 1+  Left triceps: 1+  Right patellar: 2+  Left patellar: 2+  Right achilles: 2+  Left achilles: 2+    Physical Exam  Vitals and nursing note reviewed.   Constitutional:       Appearance: Normal appearance. She is normal weight.      Comments: Very thin     HENT:      Head: Atraumatic.      Nose: Nose normal.      Mouth/Throat:      Mouth: Mucous membranes are moist.      Pharynx: Oropharynx is clear.   Eyes:      General: No visual field deficit.     Extraocular Movements: Extraocular movements intact.      Pupils: Pupils are equal, round, and reactive to light.   Cardiovascular:      Rate and Rhythm: Normal rate and regular rhythm.      Pulses: Normal pulses.   Pulmonary:      Effort: Pulmonary effort is normal.      Breath sounds: Normal breath sounds.   Abdominal:      General: Abdomen is flat.      Palpations: Abdomen is soft.   Musculoskeletal:         General: No swelling or deformity. Normal range of motion.      Cervical back: Normal range of motion. No tenderness.   Skin:     General: Skin is warm and dry.   Neurological:      Mental Status: She is alert and oriented to person, place, and time.      Cranial Nerves: No cranial nerve deficit or facial asymmetry.      Sensory: Sensation is intact.      Motor: Motor function is intact. No weakness or tremor.      Coordination: Coordination is intact.      Gait: Gait is intact.      Deep Tendon Reflexes:      Reflex Scores:       Tricep reflexes are 1+ on the right side and 1+ on the left side.       Bicep reflexes are 1+ on the right side and 1+ on the left side.       Brachioradialis reflexes are 2+ on the right side and 2+ on the left side.        Patellar reflexes are 2+ on the right side and 2+ on the left side.       Achilles reflexes are 2+ on the right side and 2+ on the left side.     Comments: Negative Spurling's maneuver R and L  Rodgers's negative     Psychiatric:         Mood and Affect: Mood normal.         Behavior: Behavior normal.         Thought Content: Thought content normal.         Judgment: Judgment normal.      Comments: She is pleasant         Assessment:       1. Anxiety    2. Partial symptomatic epilepsy with complex partial seizures, not intractable, without status epilepticus    3. Depression with anxiety            Plan:   Discussed risks and benefits of potential treatment options as well as potential side effects of medications.  Patient was counseled to continue current medications. Seizure precautions were discussed. Specifically discussed driving restrictions per [place] law. Medication compliance discussed. Instructed to call clinic if concerned or to ED if emergency.    Partial symptomatic epilepsy with complex partial seizures, not intractable, without status epilepticus  -     lamoTRIgine (LAMICTAL) 200 MG tablet; Take 1 tablet (200 mg total) by mouth 2 (two) times daily.  Dispense: 60 tablet; Refill: 5  Stable without breakthrough seizures.    Depression with anxiety  -     sertraline (ZOLOFT) 100 MG tablet; 2 tablets in AM for anxiety and depression.  Dispense: 60 tablet; Refill: 5  Better since increasing to 200 mg  Encouraged her to seek therapy, but she is reluctant to do this.     Anxiety  -     clonazePAM (KLONOPIN) 1 MG tablet; TAKE 1/2-1 TABLET BY MOUTH 1-2 TIMES DAILY  Dispense: 60 tablet; Refill: 5    She has transportation issues. Difficult for her to get here for [place].  She will follow up with PCP. Encouraged to schedule appointment soon.    ANA Barrientos

## 2024-04-29 DIAGNOSIS — F41.9 ANXIETY: Primary | ICD-10-CM

## 2024-04-29 NOTE — TELEPHONE ENCOUNTER
"Dorothy placed phone message stating "Pt called to schedule an appt for seizures due to the provider leaving an pt would like a call back today"    ----- Message from Shivani Gutierrez sent at 2024  9:39 AM CDT -----  Contact: PATIENT  Serene Dalton  MRN: [id number]  : 1970  PCP: Andie Chavez  Home Phone      [phone]  Work Phone      Not on file.  Mobile          [phone]      MESSAGE: Patient states that her pharmacy has run out of the Klonopin 1 mg and would like to know if [facility]idan can send a prescription of the 0.5 mg to [facility].        Phone: [phone]  "

## 2024-04-29 NOTE — TELEPHONE ENCOUNTER
Patient notified Klonopin 0.5 mg prescription pended to [facility] to advise, she is out of the office today, will return tomorrow 4/30/2024, appointment scheduled with Kadie Meier NP for 6 month follow up on 9/24/2024 at 9:15am, patient verbalized understanding.

## 2024-04-30 RX ORDER — CLONAZEPAM 0.5 MG/1
0.5 TABLET ORAL 2 TIMES DAILY PRN
Qty: 60 TABLET | Refills: 0 | Status: SHIPPED | OUTPATIENT
Start: 2024-04-30 | End: 2025-04-30

## 2024-09-24 ENCOUNTER — OFFICE VISIT (OUTPATIENT)
Dept: NEUROLOGY | Facility: CLINIC | Age: 54
End: 2024-09-24

## 2024-09-24 VITALS
DIASTOLIC BLOOD PRESSURE: 84 MMHG | SYSTOLIC BLOOD PRESSURE: 126 MMHG | OXYGEN SATURATION: 97 % | BODY MASS INDEX: 20.6 KG/M2 | HEART RATE: 74 BPM | RESPIRATION RATE: 16 BRPM | WEIGHT: 120.69 LBS | HEIGHT: 64 IN

## 2024-09-24 DIAGNOSIS — M62.838 CERVICAL PARASPINOUS MUSCLE SPASM: ICD-10-CM

## 2024-09-24 DIAGNOSIS — M54.2 CHRONIC NECK PAIN: ICD-10-CM

## 2024-09-24 DIAGNOSIS — G40.209 PARTIAL SYMPTOMATIC EPILEPSY WITH COMPLEX PARTIAL SEIZURES, NOT INTRACTABLE, WITHOUT STATUS EPILEPTICUS: Chronic | ICD-10-CM

## 2024-09-24 DIAGNOSIS — M79.602 LEFT ARM PAIN: ICD-10-CM

## 2024-09-24 DIAGNOSIS — M54.12 CERVICAL RADICULAR PAIN: ICD-10-CM

## 2024-09-24 DIAGNOSIS — R20.0 LEFT ARM NUMBNESS: ICD-10-CM

## 2024-09-24 DIAGNOSIS — G89.29 CHRONIC NECK PAIN: ICD-10-CM

## 2024-09-24 DIAGNOSIS — F41.9 ANXIETY: ICD-10-CM

## 2024-09-24 DIAGNOSIS — G40.209 COMPLEX PARTIAL EPILEPSY WITH GENERALIZATION: Primary | ICD-10-CM

## 2024-09-24 DIAGNOSIS — F41.8 DEPRESSION WITH ANXIETY: ICD-10-CM

## 2024-09-24 PROBLEM — Z12.11 SCREENING FOR COLORECTAL CANCER: Status: RESOLVED | Noted: 2020-06-29 | Resolved: 2024-09-24

## 2024-09-24 PROBLEM — Z12.12 SCREENING FOR COLORECTAL CANCER: Status: RESOLVED | Noted: 2020-06-29 | Resolved: 2024-09-24

## 2024-09-24 PROCEDURE — 99999 PR PBB SHADOW E&M-EST. PATIENT-LVL V: CPT | Mod: PBBFAC,,, | Performed by: NURSE PRACTITIONER

## 2024-09-24 PROCEDURE — 99215 OFFICE O/P EST HI 40 MIN: CPT | Mod: PBBFAC | Performed by: NURSE PRACTITIONER

## 2024-09-24 PROCEDURE — 99215 OFFICE O/P EST HI 40 MIN: CPT | Mod: S$PBB,,, | Performed by: NURSE PRACTITIONER

## 2024-09-24 RX ORDER — TIZANIDINE 2 MG/1
2 TABLET ORAL NIGHTLY PRN
Qty: 45 TABLET | Refills: 5 | Status: SHIPPED | OUTPATIENT
Start: 2024-09-24 | End: 2024-10-04

## 2024-09-24 RX ORDER — CLONAZEPAM 0.5 MG/1
0.5 TABLET ORAL 2 TIMES DAILY PRN
Qty: 60 TABLET | Refills: 5 | Status: SHIPPED | OUTPATIENT
Start: 2024-09-24

## 2024-09-24 RX ORDER — SERTRALINE HYDROCHLORIDE 100 MG/1
150 TABLET, FILM COATED ORAL DAILY
Qty: 135 TABLET | Refills: 1 | Status: SHIPPED | OUTPATIENT
Start: 2024-09-24

## 2024-09-24 RX ORDER — LAMOTRIGINE 200 MG/1
200 TABLET ORAL 2 TIMES DAILY
Qty: 180 TABLET | Refills: 1 | Status: SHIPPED | OUTPATIENT
Start: 2024-09-24

## 2024-09-24 NOTE — PATIENT INSTRUCTIONS
Try neck stretch exercises every day.     Heating pads may be helpful. You can also try Epsom salt baths for relief.     To have your Lamictal level drawn, if you take your night time dose at 10:00 p.m., then you should report to the lab around 9:00 a.m. to have your labs drawn, then take your morning dose of Lamictal afterwards.

## 2024-09-24 NOTE — PROGRESS NOTES
"Subjective:      Chief Complaint:  Neurologic Problem (6 month follow up)    Patient lives in [place].     History of Present Illness  Serene Dalton is a 53 y.o. female with partial generalization seizures, anxiety/depression. She is a smoker.     Patient presents today with complaint of neck pain, which radiates into her left arm, as well as dizziness.     Her neck pain began two months ago. She initially believed that she "slept wrong". She has a knot in her left neck. Pain radiates down her left arm into her hand. She has tingling/numbness to the left hand. She is not dropping any items.     She has dizziness, which began around the same time. She describes this as spinning sensation. Denies associated complaints. NO trigger with position changes. This occurs randomly. No changes to vision or hearing with episodes.     Seizures are well controlled on Lamictal. She is fully compliant with her Lamictal and takes this on a schedule. Her last seizures was 1 1/2 years ago.     She has chronic anxiety. She takes Zoloft 150 mg, but continues with generalized anxiety and panic attacks. Panic attacks occur a couple times per month.     She has cumulative stress, and some recent deaths in the family.     She works a [employer].     I have reviewed all of this patient's past medical and surgical histories as well as family and social histories and active allergies and medications as documented in the electronic medical record.    Review of Systems  Review of Systems   Constitutional:  Negative for activity change, appetite change, fatigue, fever and unexpected weight change.   HENT:  Negative for congestion, drooling, ear pain, hearing loss, mouth sores, sinus pressure, tinnitus, trouble swallowing and voice change.    Eyes: Negative.  Negative for photophobia and visual disturbance.         No Blurred vision   Respiratory:  Negative for apnea, choking and shortness of breath.    Cardiovascular:  Negative for " chest pain, palpitations and leg swelling.   Gastrointestinal:  Negative for constipation, diarrhea, nausea and vomiting.   Genitourinary:  Negative for dysuria.   Musculoskeletal:  Positive for neck pain. Negative for arthralgias, back pain, gait problem, joint swelling, myalgias and neck stiffness.   Skin:  Negative for rash.   Neurological:  Negative for dizziness, tremors, seizures, syncope, facial asymmetry, speech difficulty, weakness, light-headedness, numbness and headaches.   Hematological:  Does not bruise/bleed easily.   Psychiatric/Behavioral:  Negative for agitation, behavioral problems, confusion, decreased concentration, dysphoric mood, hallucinations, sleep disturbance and suicidal ideas. The patient is nervous/anxious.        Objective:       Vitals:    09/24/24 0915   BP: 126/84   Pulse: 74   Resp: 16     Exam:  Gen Appearance, well developed/nourished in no apparent distress  CV: 2+ distal pulses with no edema or swelling  Neuro:  MS: Awake, alert, oriented to place, person, time, situation. Sustains attention. Recent/remote memory intact, Language is full to spontaneous speech/repetition/naming/comprehension. Fund of Knowledge is full  CN: PERRL, Extraoccular movements and visual fields are full. No nystagmus or dizziness with head thrust today. Normal facial sensation and strength, Hearing symmetric, Tongue and Palate are midline and strong. Shoulder Shrug symmetric and strong.  Motor: Normal bulk, tone, no abnormal movements. 5/5 strength bilateral upper/lower extremities with 2+ reflexes and bilateral plantar response  Sensory: symmetric to light touch, pain, temp, and vibration/proprioception. Romberg negative  Cerebellar: Finger-nose,Heal-shin, Rapid alternating movements intact  Gait: Normal stance, no ataxia  MSK survey: left trapezius and cervical paraspinal spasm    Imaging:       Labs:       Assessment:   Serene Dalton is a 53 y.o. female with seizures, anxiety/depression.   Plan:    I recommend:   MRI C-spine per orders, given her chronic cervical radicular complaints.   -C-spine stretch handout  -advised on epsom salt baths and application of heat  -Tizanidine per orders  -will consider PT for neck spasm and dizziness    2. Continue Lamictal for seizure prevention.   -will check levels today, as well as general labs.     3. Refer to Psychiatry for management of anxiety, not fully controlled on Zoloft 150 mg, which she has taken long term.     -Patient has taken Klonopin 0.5 mg to 1 mg bid prn long term. I would like to very slowly taper this dose if she is able to obtain better control of her anxiety on different medications per Psychiatry. Given her seizure disorder, her Klonopin absolutely cannot be stopped abruptly, as this could potentially trigger a seizure, but it is important to note that she is taking Klonopin for anxiety and not for seizure prevention.     -we discussed the long term risks of using benzodiazepines. I will refill her Klonopin today at 0.5 mg bid prn.     FU 3 months/will call with results and any changes to the POC

## 2025-03-24 DIAGNOSIS — G40.209 PARTIAL SYMPTOMATIC EPILEPSY WITH COMPLEX PARTIAL SEIZURES, NOT INTRACTABLE, WITHOUT STATUS EPILEPTICUS: Chronic | ICD-10-CM

## 2025-03-24 DIAGNOSIS — F41.0 PANIC ATTACKS: Primary | ICD-10-CM

## 2025-03-24 DIAGNOSIS — F41.9 ANXIETY: ICD-10-CM

## 2025-03-24 DIAGNOSIS — F41.8 DEPRESSION WITH ANXIETY: ICD-10-CM

## 2025-03-24 NOTE — TELEPHONE ENCOUNTER
----- Message from Shivani sent at 3/24/2025  9:04 AM CDT -----  Contact: PATIENT  Serene GoRN: [id number]: 1970PCP: No, Primary DoctorHome Phone      [phone] Phone      Not on file.Mobile          [phone]: Patient is needing refills on Lamotrigine, Klonopin & Sertraline [facility].Phone: [phone]

## 2025-03-25 DIAGNOSIS — F41.9 ANXIETY: ICD-10-CM

## 2025-03-25 RX ORDER — CLONAZEPAM 0.5 MG/1
0.5 TABLET ORAL 2 TIMES DAILY PRN
Qty: 60 TABLET | OUTPATIENT
Start: 2025-03-25

## 2025-03-25 NOTE — TELEPHONE ENCOUNTER
At her last visit, I referred her to Psychiatry for her anxiety and panic attacks, not fully controlled on Zoloft and discussed reducing her Klonopin.     I was willing to continue the Zoloft and Klonopin until she was able to get in to see Mental Health for them to take over her medications. It would be more appropriate for Psychiatry to manage her anxiety long term.     She lives in [place], and was referred to Aislinn Schneider at [facility]. Is she seeing this provider?

## 2025-03-25 NOTE — TELEPHONE ENCOUNTER
Spoke with patient, states she did see a mental health provider after her last visit with us, states first visit evaluation was with a different person than who we referred her to, the second visit was with a male provider whom she did not feel comfortable speaking with, and she was given a third appt with a female but never went back after that.     I informed her that NP Kadie wants her to follow with psychiatry to continue these medications. She states she is willing to try again to see another provider, at [facility].     Please advise.

## 2025-03-26 RX ORDER — LAMOTRIGINE 200 MG/1
200 TABLET ORAL 2 TIMES DAILY
Qty: 180 TABLET | Refills: 1 | Status: SHIPPED | OUTPATIENT
Start: 2025-03-26

## 2025-03-26 RX ORDER — SERTRALINE HYDROCHLORIDE 100 MG/1
150 TABLET, FILM COATED ORAL DAILY
Qty: 135 TABLET | Refills: 0 | Status: SHIPPED | OUTPATIENT
Start: 2025-03-26

## 2025-03-26 RX ORDER — CLONAZEPAM 0.5 MG/1
0.5 TABLET ORAL DAILY PRN
Qty: 30 TABLET | Refills: 1 | Status: SHIPPED | OUTPATIENT
Start: 2025-03-26

## 2025-03-26 NOTE — TELEPHONE ENCOUNTER
As we cannot stop Klonopin abruptly in a patient with a history of epilepsy, I am willing to  provide her with a two month supply of the Klonopin with #30 per month, rather than 60, as this medication was supposed to be taken over by Psychiatry at this point if they agreed with her continuing on this medication for her panic attacks.     This is prescribed for panic attacks/anxiety and not for seizure prevention. I can treat her seizure disorder, but her ongoing anxiety and panic attacks must be treated by Psychiatry.     Zoloft was also filled with 2 refills until she can get in to see Psychiatry.     I understand that she does not wish to see the providers at [facility]. Please send referral to [facility].